# Patient Record
Sex: MALE | Race: WHITE | Employment: OTHER | URBAN - METROPOLITAN AREA
[De-identification: names, ages, dates, MRNs, and addresses within clinical notes are randomized per-mention and may not be internally consistent; named-entity substitution may affect disease eponyms.]

---

## 2017-07-20 ENCOUNTER — TELEPHONE (OUTPATIENT)
Dept: SURGERY | Facility: CLINIC | Age: 82
End: 2017-07-20

## 2017-07-20 ENCOUNTER — APPOINTMENT (OUTPATIENT)
Dept: LAB | Facility: HOSPITAL | Age: 82
End: 2017-07-20
Attending: UROLOGY
Payer: MEDICARE

## 2017-07-20 DIAGNOSIS — R35.1 NOCTURIA: ICD-10-CM

## 2017-07-20 DIAGNOSIS — C61 MALIGNANT NEOPLASM OF PROSTATE (HCC): ICD-10-CM

## 2017-07-20 DIAGNOSIS — R35.1 NOCTURIA: Primary | ICD-10-CM

## 2017-07-20 LAB
BILIRUB UR QL: NEGATIVE
CLARITY UR: CLEAR
COLOR UR: YELLOW
GLUCOSE UR-MCNC: NEGATIVE MG/DL
HGB UR QL STRIP.AUTO: NEGATIVE
KETONES UR-MCNC: NEGATIVE MG/DL
LEUKOCYTE ESTERASE UR QL STRIP.AUTO: NEGATIVE
NITRITE UR QL STRIP.AUTO: NEGATIVE
PH UR: 7 [PH] (ref 5–8)
PROT UR-MCNC: NEGATIVE MG/DL
PSA SERPL-MCNC: 0 NG/ML (ref 0–4)
SP GR UR STRIP: 1 (ref 1–1.03)
UROBILINOGEN UR STRIP-ACNC: <2
VIT C UR-MCNC: NEGATIVE MG/DL

## 2017-07-20 PROCEDURE — 84153 ASSAY OF PSA TOTAL: CPT

## 2017-07-20 PROCEDURE — 36415 COLL VENOUS BLD VENIPUNCTURE: CPT

## 2017-07-20 PROCEDURE — 81003 URINALYSIS AUTO W/O SCOPE: CPT

## 2017-07-20 NOTE — TELEPHONE ENCOUNTER
Pt is downstairs at lab for PSA and UA - orders  2 days ago - pls update orders in [facility] [employer]

## 2017-07-24 ENCOUNTER — OFFICE VISIT (OUTPATIENT)
Dept: SURGERY | Facility: CLINIC | Age: 82
End: 2017-07-24

## 2017-07-24 ENCOUNTER — TELEPHONE (OUTPATIENT)
Dept: SURGERY | Facility: CLINIC | Age: 82
End: 2017-07-24

## 2017-07-24 VITALS
DIASTOLIC BLOOD PRESSURE: 60 MMHG | TEMPERATURE: 98 F | SYSTOLIC BLOOD PRESSURE: 142 MMHG | WEIGHT: 158 LBS | HEIGHT: 67 IN | BODY MASS INDEX: 24.8 KG/M2

## 2017-07-24 DIAGNOSIS — N52.01 ERECTILE DYSFUNCTION DUE TO ARTERIAL INSUFFICIENCY: ICD-10-CM

## 2017-07-24 DIAGNOSIS — R35.1 NOCTURIA: ICD-10-CM

## 2017-07-24 DIAGNOSIS — C61 PROSTATE CANCER (HCC): Primary | ICD-10-CM

## 2017-07-24 PROCEDURE — G0463 HOSPITAL OUTPT CLINIC VISIT: HCPCS | Performed by: UROLOGY

## 2017-07-24 PROCEDURE — 99214 OFFICE O/P EST MOD 30 MIN: CPT | Performed by: UROLOGY

## 2017-07-24 RX ORDER — TERAZOSIN 5 MG/1
5 CAPSULE ORAL NIGHTLY
Qty: 90 CAPSULE | Refills: 3 | Status: SHIPPED | OUTPATIENT
Start: 2017-07-24 | End: 2018-07-23

## 2017-07-24 RX ORDER — SILDENAFIL 100 MG/1
TABLET, FILM COATED ORAL
Qty: 3 TABLET | Refills: 11 | Status: SHIPPED | OUTPATIENT
Start: 2017-07-24 | End: 2018-07-23

## 2017-07-24 NOTE — PATIENT INSTRUCTIONS
1. Please continue Terazosin 5 mg daily.      2. Please continue Viagra 100 mg 1-2 hours before planned sexual activity; do not take within 4 ours of taking Terazosin.     3. Return visit in one year.  PSA and complete urinalysis before visit.      Oral medications  · Do not take ED medications if you take medications containing nitrates. The combination may drop your blood pressure to a dangerous level. Nitrates include nitroglycerin (a drug for angina).  They are also in “poppers,” an inhaled recreationa

## 2017-07-24 NOTE — PROGRESS NOTES
HPI:    Patient ID: Kai Holliday is a 80year old male. HPI     1. History of prostate cancer  Brachytherapy May 1997 at [facility]. Denies symptoms to suggest prostate cancer such as unexpected weight loss, loss of appetite or new/worsening bone pain. Constitutional: Negative for chills and fever. Negative for unexpected weight loss, loss of appetite or new/worsening bone pain. Respiratory: Negative for chest tightness and shortness of breath. Cardiovascular: Negative for chest pain.    Gas Dysfunction.  1 tablet (100MG)  by oral route  daily as needed 1 - 2 hours before sexual activity Disp: 6 tablet Rfl: 10   Esomeprazole Magnesium (NEXIUM) 20 MG Oral Capsule Delayed Release  Disp:  Rfl:    Azelaic Acid (FINACEA) 15 % Apply Externally Gel Ap stable and chooses to reevaluate in 1 year. Ordered PSA blood test to be completed a few days prior to office visit in 1 year.    (N52.01) Erectile dysfunction due to arterial insufficiency  Plan: Pt has tried Viagra 100 MG in the past with improvement.  I Penny Duncan MD,  personally performed the services described in this documentation. All medical record entries made by the scribe were at my direction and in my presence.   I have reviewed the chart and discharge instructions (if applicable) and agree that t

## 2018-07-19 ENCOUNTER — APPOINTMENT (OUTPATIENT)
Dept: LAB | Facility: HOSPITAL | Age: 83
End: 2018-07-19
Attending: UROLOGY
Payer: MEDICARE

## 2018-07-19 DIAGNOSIS — C61 PROSTATE CANCER (HCC): ICD-10-CM

## 2018-07-19 DIAGNOSIS — R35.1 NOCTURIA: ICD-10-CM

## 2018-07-19 LAB
BACTERIA UR QL AUTO: NEGATIVE /HPF
BILIRUB UR QL: NEGATIVE
CLARITY UR: CLEAR
COLOR UR: YELLOW
GLUCOSE UR-MCNC: NEGATIVE MG/DL
HGB UR QL STRIP.AUTO: NEGATIVE
KETONES UR-MCNC: NEGATIVE MG/DL
LEUKOCYTE ESTERASE UR QL STRIP.AUTO: NEGATIVE
NITRITE UR QL STRIP.AUTO: NEGATIVE
PH UR: 6 [PH] (ref 5–8)
PROT UR-MCNC: NEGATIVE MG/DL
PSA SERPL-MCNC: 0 NG/ML (ref 0–4)
RBC #/AREA URNS AUTO: 1 /HPF
SP GR UR STRIP: 1.02 (ref 1–1.03)
UROBILINOGEN UR STRIP-ACNC: <2
VIT C UR-MCNC: 20 MG/DL
WBC #/AREA URNS AUTO: <1 /HPF

## 2018-07-19 PROCEDURE — 36415 COLL VENOUS BLD VENIPUNCTURE: CPT

## 2018-07-19 PROCEDURE — 84153 ASSAY OF PSA TOTAL: CPT

## 2018-07-19 PROCEDURE — 81003 URINALYSIS AUTO W/O SCOPE: CPT

## 2018-07-23 ENCOUNTER — OFFICE VISIT (OUTPATIENT)
Dept: SURGERY | Facility: CLINIC | Age: 83
End: 2018-07-23
Payer: MEDICARE

## 2018-07-23 ENCOUNTER — TELEPHONE (OUTPATIENT)
Dept: SURGERY | Facility: CLINIC | Age: 83
End: 2018-07-23

## 2018-07-23 VITALS
HEART RATE: 56 BPM | TEMPERATURE: 98 F | DIASTOLIC BLOOD PRESSURE: 60 MMHG | SYSTOLIC BLOOD PRESSURE: 152 MMHG | RESPIRATION RATE: 16 BRPM | BODY MASS INDEX: 24.96 KG/M2 | HEIGHT: 67 IN | WEIGHT: 159 LBS

## 2018-07-23 DIAGNOSIS — N52.01 ERECTILE DYSFUNCTION DUE TO ARTERIAL INSUFFICIENCY: ICD-10-CM

## 2018-07-23 DIAGNOSIS — C61 PROSTATE CANCER (HCC): Primary | ICD-10-CM

## 2018-07-23 DIAGNOSIS — R35.1 NOCTURIA: ICD-10-CM

## 2018-07-23 PROCEDURE — G0463 HOSPITAL OUTPT CLINIC VISIT: HCPCS | Performed by: UROLOGY

## 2018-07-23 PROCEDURE — 99214 OFFICE O/P EST MOD 30 MIN: CPT | Performed by: UROLOGY

## 2018-07-23 RX ORDER — ACETAMINOPHEN 160 MG
2000 TABLET,DISINTEGRATING ORAL DAILY
COMMUNITY

## 2018-07-23 RX ORDER — SILDENAFIL CITRATE 20 MG/1
TABLET ORAL
Qty: 50 TABLET | Refills: 5 | Status: SHIPPED | OUTPATIENT
Start: 2018-07-23 | End: 2018-07-23

## 2018-07-23 RX ORDER — TERAZOSIN 5 MG/1
5 CAPSULE ORAL NIGHTLY
Qty: 90 CAPSULE | Refills: 3 | Status: SHIPPED | OUTPATIENT
Start: 2018-07-23 | End: 2019-10-15

## 2018-07-23 RX ORDER — SILDENAFIL CITRATE 20 MG/1
TABLET ORAL
Qty: 50 TABLET | Refills: 5 | Status: SHIPPED | OUTPATIENT
Start: 2018-07-23 | End: 2019-08-28 | Stop reason: ALTCHOICE

## 2018-07-23 NOTE — TELEPHONE ENCOUNTER
I called the [facility] in [place] and I spoke with Judy Acosta and informed her that she can cxl the sildenafil citrate 100 mg script that it was sent in error.

## 2018-07-23 NOTE — PROGRESS NOTES
HPI:    Patient ID: Suzan Lu is a 80year old male. HPI     1. History of prostate cancer  Brachytherapy May 1997 at [address] [place] [address]. Denies symptoms to suggest prostate cancer such as unexpected weight loss, loss of appetite or new/worsening bone pain. or indurations; continue Terazosin 5 mg daily; Please continue Viagra 100 mg    Review of Systems   Constitutional: Negative for fever. HENT: Negative for voice change. Respiratory: Negative for chest tightness and shortness of breath.     Selena Torres hypertension       Past Surgical History:  No date: ANESTH,SHOULDER REPLACEMENT Right  No date: CAROTID ENDARTERECTOMY Right  No date: KNEE REPLACEMENT SURGERY Left  No date: KNEE REPLACEMENT SURGERY Right  2010: OTHER SURGICAL HISTORY Left      Comment: terri HENT:   Head: Normocephalic and atraumatic. Eyes: EOM are normal.   Neck: Normal range of motion. Pulmonary/Chest: Effort normal. No respiratory distress. Genitourinary:   Genitourinary Comments:  On DEMETRIA, prostate tiny, barely palpable, no palpable category, on 07/24/2017 per chart review. Patient is currently taking terazosin 5 MG daily which significantly improves voiding problem. He feels this condition is stable and chooses to continue terazosin 5 MG QD.  He received a prescription for terazosin 5 accurate and complete.   Tameka Schumacher MD, 7/23/2018, 4:23 PM

## 2019-08-26 ENCOUNTER — LAB ENCOUNTER (OUTPATIENT)
Dept: LAB | Facility: HOSPITAL | Age: 84
End: 2019-08-26
Attending: UROLOGY
Payer: MEDICARE

## 2019-08-26 DIAGNOSIS — R35.1 NOCTURIA: Primary | ICD-10-CM

## 2019-08-26 DIAGNOSIS — C61 MALIGNANT NEOPLASM OF PROSTATE (HCC): ICD-10-CM

## 2019-08-26 LAB
BACTERIA UR QL AUTO: NEGATIVE /HPF
BILIRUB UR QL: NEGATIVE
COLOR UR: YELLOW
GLUCOSE UR-MCNC: NEGATIVE MG/DL
HGB UR QL STRIP.AUTO: NEGATIVE
KETONES UR-MCNC: NEGATIVE MG/DL
LEUKOCYTE ESTERASE UR QL STRIP.AUTO: NEGATIVE
NITRITE UR QL STRIP.AUTO: NEGATIVE
PH UR: 6 [PH] (ref 5–8)
PROT UR-MCNC: NEGATIVE MG/DL
PSA SERPL-MCNC: <0.01 NG/ML (ref ?–4)
RBC #/AREA URNS AUTO: 1 /HPF
SP GR UR STRIP: 1.02 (ref 1–1.03)
UROBILINOGEN UR STRIP-ACNC: <2
VIT C UR-MCNC: 20 MG/DL
WBC #/AREA URNS AUTO: 0 /HPF

## 2019-08-26 PROCEDURE — 36415 COLL VENOUS BLD VENIPUNCTURE: CPT

## 2019-08-26 PROCEDURE — 84153 ASSAY OF PSA TOTAL: CPT

## 2019-08-26 PROCEDURE — 81003 URINALYSIS AUTO W/O SCOPE: CPT

## 2019-08-28 ENCOUNTER — OFFICE VISIT (OUTPATIENT)
Dept: SURGERY | Facility: CLINIC | Age: 84
End: 2019-08-28
Payer: MEDICARE

## 2019-08-28 ENCOUNTER — TELEPHONE (OUTPATIENT)
Dept: SURGERY | Facility: CLINIC | Age: 84
End: 2019-08-28

## 2019-08-28 VITALS
BODY MASS INDEX: 24.48 KG/M2 | SYSTOLIC BLOOD PRESSURE: 155 MMHG | HEIGHT: 67 IN | HEART RATE: 59 BPM | WEIGHT: 156 LBS | DIASTOLIC BLOOD PRESSURE: 69 MMHG

## 2019-08-28 DIAGNOSIS — N52.01 ERECTILE DYSFUNCTION DUE TO ARTERIAL INSUFFICIENCY: ICD-10-CM

## 2019-08-28 DIAGNOSIS — C61 PROSTATE CANCER (HCC): Primary | ICD-10-CM

## 2019-08-28 DIAGNOSIS — R35.1 NOCTURIA: ICD-10-CM

## 2019-08-28 PROCEDURE — G0463 HOSPITAL OUTPT CLINIC VISIT: HCPCS | Performed by: UROLOGY

## 2019-08-28 PROCEDURE — 99214 OFFICE O/P EST MOD 30 MIN: CPT | Performed by: UROLOGY

## 2019-08-28 RX ORDER — SILDENAFIL 100 MG/1
100 TABLET, FILM COATED ORAL
Qty: 10 TABLET | Refills: 11 | Status: SHIPPED | OUTPATIENT
Start: 2019-08-28 | End: 2020-08-31

## 2019-08-28 NOTE — PATIENT INSTRUCTIONS
Yoanna Lee M.D.    1.  Please continue terazosin 5 mg daily    2. Sildenafil (generic Viagra) 100 mg tablet--take 1.5--2 hours before planned sexual activity.   Do not take at the same time as eddi Abreu; there should b

## 2019-08-28 NOTE — TELEPHONE ENCOUNTER
Pt was seen in the office today and states he was supposed to get a printed script for Sildenafil Citrate, but forgot to  rx on his way out. Pt asking if rx can be mailed to his home address on file.

## 2019-08-28 NOTE — PROGRESS NOTES
HPI:    Patient ID: Vela Kayser is a 80year old male. HPI  Prostate Cancer  Chronic. Brachytherapy May 1997 at [address] [place] [address]. Patient has lost 3-4 lbs since the last visit but feels his is stable.  Patient denies symptoms to suggest prostate cancer such a tiny, no palpable nodules or indurations; continue Terazosin 5 mg daily; Please continue Viagra 100 mg  7/23/2018 office visit with me; On DEMETRIA, prostate tiny, barely palpable, no palpable nodules or indurations ; continue terazosin 5 mg daily; start sildena Losartan Potassium (COZAAR) 100 MG Oral Tab Take  by mouth.  take 1 tablet (100MG)  by oral route  every day Disp:  Rfl:    Fluticasone Propionate (FLONASE) 50 MCG/ACT Nasal Suspension  Disp:  Rfl:      Allergies:No Known Allergies    HISTORY:  Past Medic you went to bed at night until the time you got up in the morning?: Less than half the time  Total Symptom Score: 10  If you were to spend the rest of your life with your urinary condition just the way it is now, how would you feel about that?: Mostly Sati insufficiency  Pt has tried Viagra  MG in the past with improvement. I prescribed sildenafil 20 mg 4-5 tablets on 7/23/18 office visit though patient states he has not used the prescription. He requests a new prescription today.  I provide patient wit presence. I have reviewed the chart and discharge instructions (if applicable) and agree that the record reflects my personal performance and is accurate and complete.   Inge Montero MD, 8/28/2019, 1:24 PM

## 2019-08-29 NOTE — TELEPHONE ENCOUNTER
See yesterday's treatment plan/ patient instructions;  Copied and pasted below. Reprinted Rx from yesterday's encounter, per patient's request, Rx mailed to patient's home address on file.      2.   Sildenafil (generic Viagra) 100 mg tablet--take 1.5--2 eddy

## 2019-10-14 NOTE — TELEPHONE ENCOUNTER
Pt requesting a refill on rx. Terazosin 5mg. 90 days supply.  Send to [facility]. Call pt to advise

## 2019-10-14 NOTE — TELEPHONE ENCOUNTER
Pt LOV with Dr. Don Barraza 8/28/19 pt is calling for refill on terazosin if you agree please review and sign med. I copied and pasted part of [facility] note below. 1.  Please continue terazosin 5 mg daily     2.    Sildenafil (generic Viagra) 100 mg tablet--jeremy

## 2019-10-15 RX ORDER — TERAZOSIN 5 MG/1
5 CAPSULE ORAL NIGHTLY
Qty: 90 CAPSULE | Refills: 3 | Status: SHIPPED | OUTPATIENT
Start: 2019-10-15 | End: 2020-08-31

## 2020-08-28 ENCOUNTER — LAB ENCOUNTER (OUTPATIENT)
Dept: LAB | Facility: HOSPITAL | Age: 85
End: 2020-08-28
Attending: UROLOGY
Payer: MEDICARE

## 2020-08-28 DIAGNOSIS — C61 PROSTATE CANCER (HCC): ICD-10-CM

## 2020-08-28 LAB — PSA SERPL-MCNC: <0.01 NG/ML (ref ?–4)

## 2020-08-28 PROCEDURE — 84153 ASSAY OF PSA TOTAL: CPT

## 2020-08-28 PROCEDURE — 36415 COLL VENOUS BLD VENIPUNCTURE: CPT

## 2020-08-31 ENCOUNTER — OFFICE VISIT (OUTPATIENT)
Dept: SURGERY | Facility: CLINIC | Age: 85
End: 2020-08-31
Payer: MEDICARE

## 2020-08-31 VITALS
HEART RATE: 59 BPM | HEIGHT: 67 IN | BODY MASS INDEX: 24.48 KG/M2 | WEIGHT: 156 LBS | SYSTOLIC BLOOD PRESSURE: 163 MMHG | DIASTOLIC BLOOD PRESSURE: 69 MMHG

## 2020-08-31 DIAGNOSIS — N52.01 ERECTILE DYSFUNCTION DUE TO ARTERIAL INSUFFICIENCY: ICD-10-CM

## 2020-08-31 DIAGNOSIS — R35.1 NOCTURIA: ICD-10-CM

## 2020-08-31 DIAGNOSIS — C61 PROSTATE CANCER (HCC): Primary | ICD-10-CM

## 2020-08-31 PROCEDURE — G0463 HOSPITAL OUTPT CLINIC VISIT: HCPCS | Performed by: UROLOGY

## 2020-08-31 PROCEDURE — 99214 OFFICE O/P EST MOD 30 MIN: CPT | Performed by: UROLOGY

## 2020-08-31 RX ORDER — TERAZOSIN 5 MG/1
5 CAPSULE ORAL NIGHTLY
Qty: 90 CAPSULE | Refills: 3 | Status: SHIPPED | OUTPATIENT
Start: 2020-08-31 | End: 2021-10-05

## 2020-08-31 RX ORDER — SILDENAFIL 100 MG/1
100 TABLET, FILM COATED ORAL
Qty: 10 TABLET | Refills: 5 | Status: SHIPPED | OUTPATIENT
Start: 2020-08-31

## 2020-08-31 NOTE — PROGRESS NOTES
HPI:    Patient ID: Dionte Louise is a 80year old male. HPI     Prostate Cancer  Chronic. S/p 05/1997 brachytherapy at [facility]. Most recent 08/26/2019 PSA = <0.01. He is not on any hormone ablation therapy or chemotherapy.  Patient denies symptoms to May 1997 at [facility]. 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 Right shoulder replacement October 2006. July 7, 2012 PSA 0.0. July 7, 2011 PSA 0.0. July 8, 2009 PSA 0.0.    July 7, 2012 urinalysis normal with blood negative and R total) by mouth nightly. 90 capsule 3   • Vitamin D3 2000 units Oral Cap Take 2,000 Units by mouth daily. • Multiple Vitamin (MULTI-VITAMINS) Oral Tab Take 1 tablet by mouth. • Atorvastatin Calcium 40 MG Oral Tab Take 40 mg by mouth.      • Azelaic Genitourinary Comments: On DEMETRIA, prostate feels absent, barely palpable, no palpable nodules or indurations. Musculoskeletal: Normal range of motion. Neurological: He is alert and oriented to person, place, and time. Skin: Skin is dry.    Psychiat and Terazosin within 4 hours of one another; I strongly suggest patient continue with this practice; he understands and agrees.      (R35.1) Nocturia  Patient has current AUA score of 14, moderate voiding dysfunction category; nocturia 2x.  He is currently performed the services described in this documentation. All medical record entries made by the scribe were at my direction and in my presence.   I have reviewed the chart and discharge instructions (if applicable) and agree that the record reflects my perso

## 2020-08-31 NOTE — PATIENT INSTRUCTIONS
Silvana Mckeon M.D.     1. Please continue terazosin 5 mg daily     2. Sildenafil (generic Viagra) 100 mg tablet--take 1.5--2 hours before planned sexual activity.   Do not take at the same time as terazosin; there

## 2021-03-09 DIAGNOSIS — Z23 NEED FOR VACCINATION: ICD-10-CM

## 2021-03-09 NOTE — TELEPHONE ENCOUNTER
Last office visit; 7/18/16; Assessment/Plan is copied and pasted below. Orders generated. Instructions         Return in about 1 year (around 7/18/2017).    1. Please continue Terazosin 5 mg daily.      2. Please continue Viagra 100 mg 1-2 hours before clear

## 2021-08-27 ENCOUNTER — TELEPHONE (OUTPATIENT)
Dept: SURGERY | Facility: CLINIC | Age: 86
End: 2021-08-27

## 2021-08-27 DIAGNOSIS — R35.1 NOCTURIA: ICD-10-CM

## 2021-08-27 DIAGNOSIS — N52.01 ERECTILE DYSFUNCTION DUE TO ARTERIAL INSUFFICIENCY: ICD-10-CM

## 2021-08-27 DIAGNOSIS — C61 PROSTATE CANCER (HCC): Primary | ICD-10-CM

## 2021-08-27 NOTE — TELEPHONE ENCOUNTER
Per pt had to rs yearly appt to 10/13, lab orders will have  by then, asking for new orders to be put in. Thank you.

## 2021-10-01 ENCOUNTER — LAB REQUISITION (OUTPATIENT)
Dept: LAB | Age: 86
End: 2021-10-01

## 2021-10-01 PROCEDURE — PSEU9049 QUANTIFERON TB PLUS: Performed by: CLINICAL MEDICAL LABORATORY

## 2021-10-01 PROCEDURE — 86480 TB TEST CELL IMMUN MEASURE: CPT | Performed by: CLINICAL MEDICAL LABORATORY

## 2021-10-03 LAB
GAMMA INTERFERON BACKGROUND BLD IA-ACNC: 0.02 IU/ML
M TB IFN-G BLD-IMP: NEGATIVE
M TB IFN-G CD4+ BCKGRND COR BLD-ACNC: 0 IU/ML
M TB IFN-G CD4+CD8+ BCKGRND COR BLD-ACNC: 0.01 IU/ML
MITOGEN IGNF BCKGRD COR BLD-ACNC: 6.21 IU/ML

## 2021-10-05 RX ORDER — TERAZOSIN 5 MG/1
CAPSULE ORAL
Qty: 90 CAPSULE | Refills: 3 | Status: SHIPPED | OUTPATIENT
Start: 2021-10-05

## 2021-10-05 NOTE — TELEPHONE ENCOUNTER
This RN approved the Terazosin refill request on 10/5. Patient was last seen on 8/31/20 and has a yearly follow up on 10/13/21.  See office notes below:    Treatment Plan & Patient Instructions     1.  Please continue terazosin 5 mg daily     2. Chaz Campos 07/20/2017       PSA Screen:  No results found for: PSAS

## 2021-10-08 ENCOUNTER — LAB ENCOUNTER (OUTPATIENT)
Dept: LAB | Facility: HOSPITAL | Age: 86
End: 2021-10-08
Attending: NURSE PRACTITIONER
Payer: MEDICARE

## 2021-10-08 DIAGNOSIS — C61 PROSTATE CANCER (HCC): ICD-10-CM

## 2021-10-08 DIAGNOSIS — N52.01 ERECTILE DYSFUNCTION DUE TO ARTERIAL INSUFFICIENCY: ICD-10-CM

## 2021-10-08 DIAGNOSIS — R35.1 NOCTURIA: ICD-10-CM

## 2021-10-08 PROCEDURE — 81001 URINALYSIS AUTO W/SCOPE: CPT

## 2021-10-08 PROCEDURE — 36415 COLL VENOUS BLD VENIPUNCTURE: CPT

## 2021-10-08 PROCEDURE — 84153 ASSAY OF PSA TOTAL: CPT

## 2021-10-13 ENCOUNTER — TELEPHONE (OUTPATIENT)
Dept: SURGERY | Facility: CLINIC | Age: 86
End: 2021-10-13

## 2021-11-04 ENCOUNTER — OFFICE VISIT (OUTPATIENT)
Dept: SURGERY | Facility: CLINIC | Age: 86
End: 2021-11-04
Payer: MEDICARE

## 2021-11-04 VITALS
HEIGHT: 67 IN | BODY MASS INDEX: 24.48 KG/M2 | HEART RATE: 59 BPM | SYSTOLIC BLOOD PRESSURE: 149 MMHG | DIASTOLIC BLOOD PRESSURE: 66 MMHG | WEIGHT: 156 LBS

## 2021-11-04 DIAGNOSIS — R35.1 NOCTURIA: ICD-10-CM

## 2021-11-04 DIAGNOSIS — N52.01 ERECTILE DYSFUNCTION DUE TO ARTERIAL INSUFFICIENCY: ICD-10-CM

## 2021-11-04 DIAGNOSIS — C61 PROSTATE CANCER (HCC): Primary | ICD-10-CM

## 2021-11-04 PROCEDURE — 99213 OFFICE O/P EST LOW 20 MIN: CPT | Performed by: UROLOGY

## 2021-11-04 NOTE — PROGRESS NOTES
HPI:    Patient ID: Gunner Jaramillo is a 80year old male. HPI       Prostate Cancer  Chronic. S/p 05/1997 brachytherapy at [address] [place] [address]. Most recent 08/28/2020 PSA = <0.01. He is not on any hormone ablation therapy or chemotherapy.  Patient denies symptoms t replacement October 2006. July 7, 2012 PSA 0.0. July 7, 2011 PSA 0.0. July 8, 2009 PSA 0.0. July 7, 2012 urinalysis normal with blood negative and RBC 0.  07/24/2017 office visit with me;  On DEMETRIA, prostate tiny, no palpable nodules or indurations; simona MG Oral Tab Take  by mouth. take 1 tablet (180MG)  by ORAL route  every day     • losartan 100 MG Oral Tab Take  by mouth.  take 1 tablet (100MG)  by oral route  every day     • Sildenafil Citrate 100 MG Oral Tab Take 1 tablet (100 mg total) by mouth daily started again several times when you urinated?: Less than 1 time in 5  Over the past month, how often have you found it difficult to postpone urination?: Less than 1 time in 5  Over the past month, how often have you had a weak urinary stream?: Less than 1 0.0; UA RBC = 0  07/13/2014 PSA = 0.0                     ASSESSMENT/PLAN:   Prostate cancer ([facility])  (primary encounter diagnosis)  Erectile dysfunction due to arterial insufficiency  Nocturia         (C61) Prostate cancer ([facility] [address]  (primary encounter diagnosi PSA and complete urinalysis urine test with your primary physician in October or            November 2022    3. Please call our office immediately if you see any visible blood in the urine .                 My long-term date is tentatively August 31/Septe

## 2021-11-04 NOTE — PATIENT INSTRUCTIONS
Ev Kuhn M.D.     1.  Please continue terazosin 5 mg daily     2.   I recommend blood  draw for PSA and complete urinalysis urine test with your primary physician in October or            November 2022    3